# Patient Record
Sex: MALE | Race: WHITE
[De-identification: names, ages, dates, MRNs, and addresses within clinical notes are randomized per-mention and may not be internally consistent; named-entity substitution may affect disease eponyms.]

---

## 2018-12-13 ENCOUNTER — HOSPITAL ENCOUNTER (OUTPATIENT)
Dept: HOSPITAL 89 - RAD | Age: 38
End: 2018-12-13
Attending: INTERNAL MEDICINE
Payer: COMMERCIAL

## 2018-12-13 DIAGNOSIS — Z00.00: Primary | ICD-10-CM

## 2018-12-13 DIAGNOSIS — M54.5: ICD-10-CM

## 2018-12-13 LAB
LDLC SERPL-MCNC: 92 MG/DL
PLATELET COUNT, AUTOMATED: 274 K/UL (ref 150–450)

## 2018-12-13 PROCEDURE — 82435 ASSAY OF BLOOD CHLORIDE: CPT

## 2018-12-13 PROCEDURE — 84478 ASSAY OF TRIGLYCERIDES: CPT

## 2018-12-13 PROCEDURE — 82565 ASSAY OF CREATININE: CPT

## 2018-12-13 PROCEDURE — 84443 ASSAY THYROID STIM HORMONE: CPT

## 2018-12-13 PROCEDURE — 83718 ASSAY OF LIPOPROTEIN: CPT

## 2018-12-13 PROCEDURE — 82247 BILIRUBIN TOTAL: CPT

## 2018-12-13 PROCEDURE — 72100 X-RAY EXAM L-S SPINE 2/3 VWS: CPT

## 2018-12-13 PROCEDURE — 84075 ASSAY ALKALINE PHOSPHATASE: CPT

## 2018-12-13 PROCEDURE — 36415 COLL VENOUS BLD VENIPUNCTURE: CPT

## 2018-12-13 PROCEDURE — 84520 ASSAY OF UREA NITROGEN: CPT

## 2018-12-13 PROCEDURE — 84450 TRANSFERASE (AST) (SGOT): CPT

## 2018-12-13 PROCEDURE — 84132 ASSAY OF SERUM POTASSIUM: CPT

## 2018-12-13 PROCEDURE — 82310 ASSAY OF CALCIUM: CPT

## 2018-12-13 PROCEDURE — 84295 ASSAY OF SERUM SODIUM: CPT

## 2018-12-13 PROCEDURE — 82465 ASSAY BLD/SERUM CHOLESTEROL: CPT

## 2018-12-13 PROCEDURE — 84460 ALANINE AMINO (ALT) (SGPT): CPT

## 2018-12-13 PROCEDURE — 82947 ASSAY GLUCOSE BLOOD QUANT: CPT

## 2018-12-13 PROCEDURE — 81001 URINALYSIS AUTO W/SCOPE: CPT

## 2018-12-13 PROCEDURE — 82040 ASSAY OF SERUM ALBUMIN: CPT

## 2018-12-13 PROCEDURE — 84155 ASSAY OF PROTEIN SERUM: CPT

## 2018-12-13 PROCEDURE — 85025 COMPLETE CBC W/AUTO DIFF WBC: CPT

## 2018-12-13 PROCEDURE — 82374 ASSAY BLOOD CARBON DIOXIDE: CPT

## 2018-12-13 NOTE — RADIOLOGY IMAGING REPORT
FACILITY: [facility] 

 

PATIENT NAME: Benjamin Markley

: 1980

MR: [id number]

V: [id number]

EXAM DATE: 

ORDERING PHYSICIAN: HORTENSIA SENA

TECHNOLOGIST: 

 

Location: [facility] [place]

Patient: Markley, Benjamin

: 1980

MRN: [id number]

Visit/Account:[id number]

Date of Sevice: 2018

 

ACCESSION #: [id number]

 

Technique: LUMBAR SPINE 2 OR 3 VIEW

 

HISTORY: back pain

 

Comparison studies:  None

 

FINDINGS: There is no acute fracture.  5 nonrib-bearing lumbar type vertebral bodies are present.  No
cedrick is endplate osteophytosis.  The vertebral body heights are maintained although there is slight we
dging of the vertebral bodies at the thoracolumbar junction; likely secondary to degenerative and rot
ational positioning.  Soft tissues are unremarkable.

 

IMPRESSION:

1.  Mild degenerative changes as described above.

 

Report Dictated By: Juan Meza DO at 2018 9:10 AM

 

Report E-Signed By: Juan Meza DO  at 2018 9:12 AM

 

WSN:[id number]

## 2019-03-05 ENCOUNTER — HOSPITAL ENCOUNTER (OUTPATIENT)
Dept: HOSPITAL 89 - LAB | Age: 39
End: 2019-03-05
Attending: OTOLARYNGOLOGY
Payer: COMMERCIAL

## 2019-03-05 DIAGNOSIS — J30.9: Primary | ICD-10-CM

## 2019-03-05 PROCEDURE — 86003 ALLG SPEC IGE CRUDE XTRC EA: CPT

## 2019-03-05 PROCEDURE — 36415 COLL VENOUS BLD VENIPUNCTURE: CPT
